# Patient Record
Sex: MALE | Race: WHITE
[De-identification: names, ages, dates, MRNs, and addresses within clinical notes are randomized per-mention and may not be internally consistent; named-entity substitution may affect disease eponyms.]

---

## 2021-10-19 PROBLEM — Z00.00 ENCOUNTER FOR PREVENTIVE HEALTH EXAMINATION: Status: ACTIVE | Noted: 2021-10-19

## 2022-01-21 ENCOUNTER — APPOINTMENT (OUTPATIENT)
Dept: ORTHOPEDIC SURGERY | Facility: CLINIC | Age: 32
End: 2022-01-21
Payer: COMMERCIAL

## 2022-01-21 VITALS
HEART RATE: 112 BPM | HEIGHT: 71 IN | BODY MASS INDEX: 29.4 KG/M2 | WEIGHT: 210 LBS | DIASTOLIC BLOOD PRESSURE: 85 MMHG | SYSTOLIC BLOOD PRESSURE: 122 MMHG

## 2022-01-21 PROCEDURE — 99204 OFFICE O/P NEW MOD 45 MIN: CPT

## 2022-01-21 PROCEDURE — 73030 X-RAY EXAM OF SHOULDER: CPT | Mod: LT

## 2022-01-26 NOTE — DISCUSSION/SUMMARY
[de-identified] : Left shoulder rotator cuff syndrome\par \par \par I discussed my findings on history, exam and radiology.\par \par I reviewed the anatomy and function of the shoulder rotator cuff muscles and tendons, biceps tendon and labrum.Given the current findings for the patient, I recommend proceeding with advanced imaging to better characterize and diagnose the problem to aid patient care, management and treatment guidance as I suspect an internal injury to the knee not diagnosed on non-diagnostic plain radiographs causing the patients symptoms and physical examination to help provide surgical management planning.\par \par Therefore given the patients continued symptoms and the patient has continued pain and weakness and impaired sleep affecting the patient's ADLs and limiting activities negatively affecting the patient's quality of life, examination and history consistent with internal shoulder derangement or rotator cuff injury with weakness of the rotator cuff muscles along with severe pain I am recommending obtaining advanced MRI imaging of the shoulder to identify damaged structures in order to facilite preopertive planning. I discussed with the patient that I am ordering an MRI to assess the soft tissue structures in the joint such as the joint capsule, ligaments, rotator cuff and biceps tendons, as well as to assess the cartilage, muscle bellies, cysts, AC joint edema or other pathology. The test will need insurance approval and will take place at a [facility] facility or outside facility. If the patient elects to obtain the MRI from an outside facility, the patient understands they have been instructed to bring in both the final radiologist read and a CD/DVD with the MRI images to allow review of the imaging test by myself during the follow up visit. I do not recommend obtaining an open standing MRI as the quality of the images is subpar and makes it difficult to diagnose intra-articular derangements and guide care discussion and decision making.\par \par The patient was prescribed Diclofenac PO non-steroidal anti-inflammatory medication. 50mg tablets twice daily to be taken for at least 1-2 weeks in a row and then PRN afterwards. Risks and benefits were discussed and include but not limited to renal damage and GI ulceration and bleeding.  They were advised to take with food to limit stomach upset as well as warned to stop the medication if worsening gastric pain or dizziness or other side effects. Also to immediately stop the medication and seek appropriate medical attention if any severe stomach ache, gastritis, black/red vomit, black/red stools or any other medical concern.\par \par The patient verifies their understanding the the visit, diagnosis and plan. They agree with the treatment plan and will contact the office with any questions or problems.\par \par FU after MRI completed

## 2022-01-26 NOTE — HISTORY OF PRESENT ILLNESS
[de-identified] : CC LEFT shoulder\par \par HPI 31 MALE RHD presents with 2 day acute exacerbation of 2 months of constant pain to the entire left shoulder after a painless pop after original injury during a \par football tackle where he was hit in his lateral shoulder.  \par The pain is WORSE, and rated a 10 out of 10, described as sharp, stabbing, without radiation.\par Rest makes the pain better and internal, external rotation makes the pain worse.\par The patient reports associated symptoms of weakness, instability\par The patient endorses pain at night affecting sleep, denies neck pain, and denies any similar pain previously.\par \par The patient has tried the following treatments:\par Activity modification	-\par Ice			-\par Nsaids    		+ ibuprofen 800\par Physical Therapy  	-\par Cortisone Injection	-\par Arthroscopy/Surgery	-\par \par Review of Systems is positive for the above musculoskeletal symptoms and is otherwise non-contributory for general, constitutional, psychiatric, neurologic, HEENT, cardiac, respiratory, gastrointestinal, reproductive, lymphatic, and dermatologic complaints.\par \par Consult by Dr arriola

## 2022-01-26 NOTE — PHYSICAL EXAM
[de-identified] : Physical Examination\par General: well nourished, in no acute distress, alert and oriented to person, place and time\par Psychiatric: normal mood and affect, no abnormal movements or speech patterns\par Eyes: vision intact [-] glasses\par \par Musculoskeletal Examination\par Cervical spine	Full painless range of motion and negative Spurling's test\par \par unable to examine left shoulder at all due to patient apprehension\par unable to test ROM with inability to passively or actively FF or abduct from neutral\par unable to test motor\par \par Shoulder			Right			Left\par Appearance\par      Skin/Swelling/Deformity	normal			normal\par      Scapular Winging		-			-\par Range of Motion\par      Forward Flexion		170 / 170		170 / 170&\par      Abduction			170 / 170		170 / 170&\par      External Rotation		45			45&\par      Internal Rotation		T10			T10&\par      SAbd Ext Rotation		90			90&\par      SAbd Int Rotation		80			80&\par      Painful Arc			-			+\par      Crepitus			-			-&\par Palpation\par      Clavicle			-			+\par      AC Joint			-			+\par      Posterior Acromion		-			+\par      Levator Scapula		-			+\par      Lateral Bursa			-			+\par      Impingement Area		-			+\par      Biceps Tendon		-			+\par      Anterior Capsule		-			+\par Strength Examination\par      Supraspinatous 		5+ / 0			5+ / 0&\par      Infraspinatous			5+ / 0			5+ / 0&\par      Subscapularis			5+ / 0			5+ / 0&\par      Belly Press			5+ / 0			5+ / 0&\par      Lift Off			-			-&\par      Drop-Arm			-			-&\par Special Examination\par      Biceps Bedias's		-			-&\par      Impingement Neer		-			-&\par      Impingement Hawking		-			-&\par \par      Apprehension			-			-&\par           Suppression Appre		-			-&\par      Anterior Subluxation		-			-&\par      Posterior Subluxation		-			-&\par      AC Cross-Body\par           Anterior			-			-&\par           Posterior			-			-&\par \par Sensation\par      Axillary			normal			normal\par      LatAntCubBrach 		normal			normal\par      Median 			normal			normal\par      Ulnar 			normal			normal\par      Radial 			normal			normal\par Motor\par      AIN 				normal			normal\par      Ulnar 			normal			normal\par      Radial 			normal			normal\par      PIN 				normal			normal\par Pulses\par      Radial			2+			2+ [de-identified] : 4 views of the affected Left shoulder (AP, Glenoid, Y-View, velpeau) unable to abduct arm from neutral\par were ordered, obtained and evaluated by myself today and\par demonstrate:\par normal bony calcification without dislocation and no fracture\par 	Arch	2B\par 	AC Joint	no Arthrosis\par 	GH Joint	no a Arthrosis\par 	Calcifications	none

## 2022-02-08 ENCOUNTER — APPOINTMENT (OUTPATIENT)
Dept: ORTHOPEDIC SURGERY | Facility: CLINIC | Age: 32
End: 2022-02-08
Payer: COMMERCIAL

## 2022-02-08 PROCEDURE — 99214 OFFICE O/P EST MOD 30 MIN: CPT

## 2022-02-08 NOTE — DISCUSSION/SUMMARY
[de-identified] : Left shoulder bankart, rotator cuff tendinosis\par \par \par I discussed my findings on history, exam and radiology.\par \par given shoulder motion, patient age, lack of instability symptoms, non-dominant arm, sports activity level recommend:\par \par proceed with non-op w\par \par PT\par \par nsaids\par \par very long discussion held with the patient about non-op and op management. given the above, patient has a possibility of good likelihood of successful non-operative management but limited ability to discuss more given lack of physical examination due to patient apprehension. patient very upset over diagnosis but patient reassured that the problem is potentially addressable surgically if he fails non-op management which can be successful given the patient findings.\par \par fu 2 mo

## 2022-02-08 NOTE — PHYSICAL EXAM
[de-identified] : Physical Examination\par General: well nourished, anxious, alert and oriented to person, place and time\par Psychiatric: normal mood and affect, no abnormal movements or speech patterns\par Eyes: vision intact [-] glasses\par \par Musculoskeletal Examination\par Cervical spine	Full painless range of motion and negative Spurling's test\par \par unchanged ability to examine shoulder due to patient apprehension\par \par \par Shoulder			Right			Left\par Appearance\par      Skin/Swelling/Deformity	normal			normal\par      Scapular Winging		-			-\par Range of Motion\par      Forward Flexion		170 / 170		170 / 170&\par      Abduction			170 / 170		170 / 170&\par      External Rotation		45			45&\par      Internal Rotation		T10			T10&\par      SAbd Ext Rotation		90			90&\par      SAbd Int Rotation		80			80&\par      Painful Arc			-			+\par      Crepitus			-			-&\par Palpation\par      Clavicle			-			+\par      AC Joint			-			+\par      Posterior Acromion		-			+\par      Levator Scapula		-			+\par      Lateral Bursa			-			+\par      Impingement Area		-			+\par      Biceps Tendon		-			+\par      Anterior Capsule		-			+\par Strength Examination\par      Supraspinatous 		5+ / 0			5+ / 0&\par      Infraspinatous			5+ / 0			5+ / 0&\par      Subscapularis			5+ / 0			5+ / 0&\par      Belly Press			5+ / 0			5+ / 0&\par      Lift Off			-			-&\par      Drop-Arm			-			-&\par Special Examination\par      Biceps Brooklyn's		-			-&\par      Impingement Neer		-			-&\par      Impingement Hawking		-			-&\par \par      Apprehension			-			-&\par           Suppression Appre		-			-&\par      Anterior Subluxation		-			-&\par      Posterior Subluxation		-			-&\par      AC Cross-Body\par           Anterior			-			-&\par           Posterior			-			-&\par \par Sensation\par      Axillary			normal			normal\par      LatAntCubBrach 		normal			normal\par      Median 			normal			normal\par      Ulnar 			normal			normal\par      Radial 			normal			normal\par Motor\par      AIN 				normal			normal\par      Ulnar 			normal			normal\par      Radial 			normal			normal\par      PIN 				normal			normal\par Pulses\par      Radial			2+			2+ [de-identified] : 4 views of the affected Left shoulder (AP, Glenoid, Y-View, velpeau) unable to abduct arm from neutral\par 1-21-22\par demonstrate:\par normal bony calcification without dislocation and no fracture\par 	Arch	2B\par 	AC Joint	no Arthrosis\par 	GH Joint	no a Arthrosis\par 	Calcifications	none\par \par MRI Left shoulder from [facility] on 2-1-22\par My impression of the images:\par Quality of the MRI is good\par Supraspinatous Tendon mild signal no tear\par Infraspinatous Tendon ok\par Subscapularis Tendon ok\par Teres Minor Tendon ok\par Muscle Belly Atrophy none\par Biceps Tendon is in the groove and looks ok intra-articularly but poorly visualized with a stable attachment anchor\par Superior Labrum possible peel back vs tear\par Anterior Labrum anterior inferior tear extending to 6 oclock\par Posterior Labrum ok\par AC joint mild signal\par There is no full thickness chondral lesion of the glenoid and humeral head\par \par The Final Radiologist Impression:\par Rotator cuff: There is mild heterogeneous signal abnormality within the supraspinatus, infraspinatus, and subscapularis tendons compatible with tendinopathy. There is no rotator cuff tear. The para-articular musculature is unremarkable.\par \par Glenohumeral joint: There is a small glenohumeral joint effusion with mild synovitis extending into the subscapularis recess and biceps tendon sheath. There is abnormal signal and/or deformity of the anterior, superior, and inferior labrum compatible with diffuse tear. There is fragmentation of the anteroinferior glenoid rim from the 4-6 o'clock position, with mild displacement along with moderate surrounding intermediate signal intensity granulation tissue. The posterior labrum is intact. There is mild heterogeneous thickening of the inferior and anteroinferior joint capsule. There is minimal high-grade chondral fissuring over the anteroinferior glenoid. The remainder of the articular cartilage is well preserved.\par \par Bursa: There is no significant fluid in the subacromial/subdeltoid bursa. \par \par Long head biceps tendon: The long head of the biceps tendon is normal in position and signal intensity. \par \par Acromioclavicular joint: The acromioclavicular joint is intact.\par \par Osseous findings: There is a subtle small cortical depression along the posterolateral humeral head without surrounding bone marrow edema. There is no acute fracture or other focal bone marrow signal abnormality. \par \par IMPRESSION:  \par 1. Diffuse tear of the superior, anterior, and inferior labrum with mildly displaced bony Bankart lesion,  subtle small Hill-Sachs deformity of the humeral head, along with a chronic sprain of the inferior joint capsule.\par 2. Mild diffuse rotator cuff tendinopathy. No rotator cuff tear identified.\par 3. Small glenohumeral joint effusion with mild synovitis.

## 2022-02-08 NOTE — HISTORY OF PRESENT ILLNESS
[de-identified] : CC LEFT shoulder\par \par HPI 31 MALE RHD presents with 2 day acute exacerbation of 2 months of constant pain to the entire left shoulder after a painless pop after original injury during a \par football tackle where he was hit in his lateral shoulder.  \par The pain is WORSE, and rated a 10 out of 10, described as sharp, stabbing, without radiation.\par Rest makes the pain better and internal, external rotation makes the pain worse.\par The patient reports associated symptoms of weakness, instability\par The patient endorses pain at night affecting sleep, denies neck pain, and denies any similar pain previously.\par \par The patient has tried the following treatments:\par Activity modification	-\par Ice			-\par Nsaids    		+ ibuprofen 800\par Physical Therapy  	-\par Cortisone Injection	-\par Arthroscopy/Surgery	-\par \par Review of Systems is positive for the above musculoskeletal symptoms and is otherwise non-contributory for general, constitutional, psychiatric, neurologic, HEENT, cardiac, respiratory, gastrointestinal, reproductive, lymphatic, and dermatologic complaints.\par \par Consult by Dr arriola

## 2022-02-22 ENCOUNTER — TRANSCRIPTION ENCOUNTER (OUTPATIENT)
Age: 32
End: 2022-02-22

## 2022-02-23 ENCOUNTER — NON-APPOINTMENT (OUTPATIENT)
Age: 32
End: 2022-02-23

## 2022-03-15 ENCOUNTER — RX RENEWAL (OUTPATIENT)
Age: 32
End: 2022-03-15

## 2022-03-15 RX ORDER — DICLOFENAC SODIUM 75 MG/1
75 TABLET, DELAYED RELEASE ORAL
Qty: 60 | Refills: 1 | Status: ACTIVE | COMMUNITY
Start: 2022-01-21 | End: 1900-01-01

## 2022-04-12 ENCOUNTER — APPOINTMENT (OUTPATIENT)
Dept: ORTHOPEDIC SURGERY | Facility: CLINIC | Age: 32
End: 2022-04-12
Payer: COMMERCIAL

## 2022-04-12 VITALS
HEART RATE: 72 BPM | HEIGHT: 71 IN | OXYGEN SATURATION: 98 % | BODY MASS INDEX: 29.4 KG/M2 | DIASTOLIC BLOOD PRESSURE: 69 MMHG | SYSTOLIC BLOOD PRESSURE: 118 MMHG | WEIGHT: 210 LBS

## 2022-04-12 DIAGNOSIS — M75.02 ADHESIVE CAPSULITIS OF LEFT SHOULDER: ICD-10-CM

## 2022-04-12 DIAGNOSIS — S43.492A OTHER SPRAIN OF LEFT SHOULDER JOINT, INITIAL ENCOUNTER: ICD-10-CM

## 2022-04-12 DIAGNOSIS — M67.912 UNSPECIFIED DISORDER OF SYNOVIUM AND TENDON, LEFT SHOULDER: ICD-10-CM

## 2022-04-12 PROCEDURE — 99214 OFFICE O/P EST MOD 30 MIN: CPT

## 2022-04-12 NOTE — PHYSICAL EXAM
[de-identified] : Physical Examination\par General: well nourished, anxious, alert and oriented to person, place and time\par Psychiatric: normal mood and affect, no abnormal movements or speech patterns\par Eyes: vision intact [-] glasses\par \par Musculoskeletal Examination\par Cervical spine	Full painless range of motion and negative Spurling's test\par \par limited exam\par \par \par Shoulder			Right			Left\par Appearance\par      Skin/Swelling/Deformity	normal			normal\par      Scapular Winging		-			-\par Range of Motion\par      Forward Flexion		170 / 170		70\par      Abduction			170 / 170		170 / 170&\par      External Rotation		75			15\par      Internal Rotation		T10			T10&\par      SAbd Ext Rotation		90			90&\par      SAbd Int Rotation		80			80&\par      Painful Arc			-			+\par      Crepitus			-			-\par Palpation\par      Clavicle			-			+\par      AC Joint			-			+\par      Posterior Acromion		-			+\par      Levator Scapula		-			+\par      Lateral Bursa			-			+\par      Impingement Area		-			+\par      Biceps Tendon		-			+\par      Anterior Capsule		-			+\par Strength Examination\par      Supraspinatous 		5+ / 0			5+ / 0&\par      Infraspinatous			5+ / 0			5+ / 0&\par      Subscapularis			5+ / 0			5+ / 0&\par      Belly Press			5+ / 0			5+ / 0&\par      Lift Off			-			-&\par      Drop-Arm			-			-&\par Special Examination\par      Biceps Frederick's		-			-&\par      Impingement Neer		-			-&\par      Impingement Hawking		-			-&\par \par      Apprehension			-			-&\par           Suppression Appre		-			-&\par      Anterior Subluxation		-			-&\par      Posterior Subluxation		-			-&\par      AC Cross-Body\par           Anterior			-			-&\par           Posterior			-			-&\par \par Sensation\par      Axillary			normal			normal\par      LatAntCubBrach 		normal			normal\par      Median 			normal			normal\par      Ulnar 			normal			normal\par      Radial 			normal			normal\par Motor\par      AIN 				normal			normal\par      Ulnar 			normal			normal\par      Radial 			normal			normal\par      PIN 				normal			normal\par Pulses\par      Radial			2+			2+ [de-identified] : 4 views of the affected Left shoulder (AP, Glenoid, Y-View, velpeau) unable to abduct arm from neutral\par 1-21-22\par demonstrate:\par normal bony calcification without dislocation and no fracture\par 	Arch	2B\par 	AC Joint	no Arthrosis\par 	GH Joint	no a Arthrosis\par 	Calcifications	none\par \par MRI Left shoulder from [facility] on 2-1-22\par My impression of the images:\par Quality of the MRI is good\par Supraspinatous Tendon mild signal no tear\par Infraspinatous Tendon ok\par Subscapularis Tendon ok\par Teres Minor Tendon ok\par Muscle Belly Atrophy none\par Biceps Tendon is in the groove and looks ok intra-articularly but poorly visualized with a stable attachment anchor\par Superior Labrum possible peel back vs tear\par Anterior Labrum anterior inferior tear extending to 6 oclock\par Posterior Labrum ok\par AC joint mild signal\par There is no full thickness chondral lesion of the glenoid and humeral head\par \par The Final Radiologist Impression:\par Rotator cuff: There is mild heterogeneous signal abnormality within the supraspinatus, infraspinatus, and subscapularis tendons compatible with tendinopathy. There is no rotator cuff tear. The para-articular musculature is unremarkable.\par \par Glenohumeral joint: There is a small glenohumeral joint effusion with mild synovitis extending into the subscapularis recess and biceps tendon sheath. There is abnormal signal and/or deformity of the anterior, superior, and inferior labrum compatible with diffuse tear. There is fragmentation of the anteroinferior glenoid rim from the 4-6 o'clock position, with mild displacement along with moderate surrounding intermediate signal intensity granulation tissue. The posterior labrum is intact. There is mild heterogeneous thickening of the inferior and anteroinferior joint capsule. There is minimal high-grade chondral fissuring over the anteroinferior glenoid. The remainder of the articular cartilage is well preserved.\par \par Bursa: There is no significant fluid in the subacromial/subdeltoid bursa. \par \par Long head biceps tendon: The long head of the biceps tendon is normal in position and signal intensity. \par \par Acromioclavicular joint: The acromioclavicular joint is intact.\par \par Osseous findings: There is a subtle small cortical depression along the posterolateral humeral head without surrounding bone marrow edema. There is no acute fracture or other focal bone marrow signal abnormality. \par \par IMPRESSION:  \par 1. Diffuse tear of the superior, anterior, and inferior labrum with mildly displaced bony Bankart lesion,  subtle small Hill-Sachs deformity of the humeral head, along with a chronic sprain of the inferior joint capsule.\par 2. Mild diffuse rotator cuff tendinopathy. No rotator cuff tear identified.\par 3. Small glenohumeral joint effusion with mild synovitis.

## 2022-04-12 NOTE — DISCUSSION/SUMMARY
[de-identified] : Left shoulder bankart, rotator cuff tendinosis, adhesive capsulitis\par \par \par I discussed my findings on history, exam and radiology.\par \par given shoulder motion, patient age, lack of instability symptoms, non-dominant arm, sports activity level recommend:\par \par proceed with non-op w\par \par PT\par \par nsaids -unable to give diclfenac due to med stom fever diagnosis\par \par pain mostly from adhesive capsulitis\par \par declined injectino cortisone\par \par fu 2 mo

## 2022-04-12 NOTE — HISTORY OF PRESENT ILLNESS
[de-identified] : CC LEFT shoulder\par \par HPI 31 MALE RHD presents for pt fu of 2 day acute exacerbation of 2 months of constant pain to the entire left shoulder after a painless pop after original injury during a \par football tackle where he was hit in his lateral shoulder.  \par The pain is WORSE, and rated a 10 out of 10, described as sharp, stabbing, without radiation.\par Rest makes the pain better and internal, external rotation makes the pain worse.\par The patient reports associated symptoms of weakness, instability\par The patient endorses pain at night affecting sleep, denies neck pain, and denies any similar pain previously.\par \par The patient has tried the following treatments:\par Activity modification	-\par Ice			-\par Nsaids    		- unable mediterranean stomach fever\par Physical Therapy  	+ some help\par Cortisone Injection	-\par Arthroscopy/Surgery	-\par \par tight shoulder\par pain now over medial collar bone and left neck and under axilla\par shoulder less painful\par pain 7/10\par \par Review of Systems is positive for the above musculoskeletal symptoms and is otherwise non-contributory for general, constitutional, psychiatric, neurologic, HEENT, cardiac, respiratory, gastrointestinal, reproductive, lymphatic, and dermatologic complaints.\par \par Consult by Dr arriola

## 2022-07-05 ENCOUNTER — APPOINTMENT (OUTPATIENT)
Dept: PEDIATRIC MEDICAL GENETICS | Facility: CLINIC | Age: 32
End: 2022-07-05

## 2022-07-05 DIAGNOSIS — R10.9 UNSPECIFIED ABDOMINAL PAIN: ICD-10-CM

## 2022-07-05 PROCEDURE — 99203 OFFICE O/P NEW LOW 30 MIN: CPT | Mod: 95

## 2022-07-05 NOTE — REASON FOR VISIT
[Home] : at home, [unfilled] , at the time of the visit. [Other Location: e.g. Home (Enter Location, City,State)___] : at [unfilled] [Other:____] : [unfilled] [Patient] : the patient